# Patient Record
Sex: MALE | Race: WHITE | Employment: FULL TIME | URBAN - METROPOLITAN AREA
[De-identification: names, ages, dates, MRNs, and addresses within clinical notes are randomized per-mention and may not be internally consistent; named-entity substitution may affect disease eponyms.]

---

## 2021-09-16 ENCOUNTER — TELEPHONE (OUTPATIENT)
Dept: DIABETES SERVICES | Age: 41
End: 2021-09-16

## 2021-09-16 NOTE — TELEPHONE ENCOUNTER
Received Diabetes referral for education. First call to initiate Registration Process. Patient is not sure if he wants to do education with the time involved. Explained program.  He will think about it. He wants us to call him back next week and he will give us his decision.

## 2021-09-22 ENCOUNTER — TELEPHONE (OUTPATIENT)
Dept: DIABETES SERVICES | Age: 41
End: 2021-09-22

## 2021-09-22 NOTE — TELEPHONE ENCOUNTER
Called patient to see if he had made a decision about Diabetes Education. He wanted me to call him this week. He reports he is about to lose his insurance. He may see if he is better financially and has insurance restored to maybe do the education in 2022. Instructed him to call us or notify his PCP when ready.  Closing Diabetes referral.

## 2022-03-02 ENCOUNTER — NURSE TRIAGE (OUTPATIENT)
Dept: OTHER | Facility: CLINIC | Age: 42
End: 2022-03-02

## 2022-03-02 NOTE — TELEPHONE ENCOUNTER
Limited triage. Caller not with Adult. Received call from Madhu Arteaga at [facility] with [employer] Complaint. Subjective: Caller states \"fsbs has been running in the 300's\"     Current Symptoms:   - Nausea   - Excessive thirst (drinking Mt dew)  - Fatigue, sleeping a lot more     Onset:  Couple months     Associated Symptoms: na    Pain Severity: RACHAEL    Temperature:  N/a    What has been tried: None    LMP: na  Pregnant: na    Recommended disposition: Sean Calles 3290 advice provided, patient verbalizes understanding; denies any other questions or concerns; instructed to call back for any new or worsening symptoms. Home care with care advice. Discussed call back reasons. Warm transfer to Winnie Santoro at [facility]. Attention Provider: Thank you for allowing me to participate in the care of your patient. The patient was connected to triage in response to information provided to the [facility]. Please do not respond through this encounter as the response is not directed to a shared pool.       Reason for Disposition   [1] Blood glucose 240 - 300 mg/dL (13.3 - 16.7 mmol/L) AND [2] does not  use insulin (e.g., not insulin-dependent; most people with type 2 diabetes)    Protocols used: DIABETES - HIGH BLOOD SUGAR-ADULT-

## 2022-04-04 ENCOUNTER — NURSE TRIAGE (OUTPATIENT)
Dept: OTHER | Facility: CLINIC | Age: 42
End: 2022-04-04

## 2022-04-04 NOTE — TELEPHONE ENCOUNTER
Received call from Nicholas Lilly at [facility] with [employer] Complaint. Patient had already been triaged, so additional triage performed. Writer called [facility] for further assistance      Writer provided warm transfer to Jesus Garcia at [address] [place] [address] for further assistance    Attention Provider: Thank you for allowing me to participate in the care of your patient. The patient was connected to triage in response to information provided to the [facility]. Please do not respond through this encounter as the response is not directed to a shared pool.       Reason for Disposition   NON-URGENT call redirected to PCP's office because it is open    Protocols used: NO CONTACT OR DUPLICATE CONTACT CALL-ADULT-

## 2022-04-04 NOTE — TELEPHONE ENCOUNTER
Received call from Opal Ray at [facility] with [employer]. Advised caller this will be a limited triage due to patient not being with the caller. Subjective: Caller is patient's wife and she states \"His right arm has been hurting him for about 2-3 weeks. For the past week he is saying it's hurting. His arm is now swollen from over the weekend and it is double in size. When he stands up in the morning his legs are weak and he feels like he can't stand. His sugars have been running in the 300s. He is tired all the time. \"     Current Symptoms: High blood sugars, Right arm pain and swelling, weak legs, fatigue    Onset: 2 days ago; worsening    Associated Symptoms: reduced activity    Pain Severity: unable to assess, patient not with caller    Temperature: none    What has been tried: nothing    LMP: NA Pregnant: NA    Recommended disposition: Go to ED/[facility] Now (Or to Office with PCP Approval)    Care advice provided, patient verbalizes understanding; denies any other questions or concerns; instructed to call back for any new or worsening symptoms. When I placed caller on hold to get the office on the line caller disconnected. I called the caller back twice and left a message for her to call the office back for scheduling. I was unable to complete the second level triage due to phone disconnection    Attention Provider: Thank you for allowing me to participate in the care of your patient. The patient was connected to triage in response to information provided to the [facility]. Please do not respond through this encounter as the response is not directed to a shared pool.       Reason for Disposition   Entire arm is swollen    Protocols used: ARM PAIN-ADULT-[place]

## 2022-05-19 DIAGNOSIS — E11.65 TYPE 2 DIABETES MELLITUS WITH HYPERGLYCEMIA, WITHOUT LONG-TERM CURRENT USE OF INSULIN (HCC): ICD-10-CM

## 2022-05-19 DIAGNOSIS — I10 BENIGN ESSENTIAL HTN: ICD-10-CM

## 2022-05-19 DIAGNOSIS — E29.1 HYPOGONADISM IN MALE: Primary | ICD-10-CM

## 2022-06-01 RX ORDER — VALSARTAN 160 MG/1
TABLET ORAL
Qty: 90 TABLET | Refills: 3 | Status: SHIPPED | OUTPATIENT
Start: 2022-06-01

## 2022-10-05 RX ORDER — GLIPIZIDE 5 MG/1
5 TABLET, FILM COATED, EXTENDED RELEASE ORAL DAILY
Qty: 30 TABLET | Refills: 5 | Status: SHIPPED | OUTPATIENT
Start: 2022-10-05

## 2022-10-05 RX ORDER — GLIPIZIDE 5 MG/1
TABLET, FILM COATED, EXTENDED RELEASE ORAL
COMMUNITY
Start: 2022-10-02 | End: 2022-10-05 | Stop reason: SDUPTHER

## 2022-10-05 NOTE — TELEPHONE ENCOUNTER
Patient needs refill on glipizide. Patient is aware he needs to make a f/u appointment. Requesting one month supply.

## 2022-12-09 ENCOUNTER — TELEPHONE (OUTPATIENT)
Dept: FAMILY MEDICINE CLINIC | Facility: CLINIC | Age: 42
End: 2022-12-09

## 2022-12-12 NOTE — TELEPHONE ENCOUNTER
Your PA has been faxed to the plan as a paper copy. Please contact the plan directly if you haven't received a determination in a typical timeframe. You will be notified of the determination via fax.       Contact plan to follow up on [id number]

## 2024-05-02 RX ORDER — GLIPIZIDE 5 MG/1
5 TABLET, FILM COATED, EXTENDED RELEASE ORAL DAILY
Qty: 30 TABLET | Refills: 5 | OUTPATIENT
Start: 2024-05-02